# Patient Record
Sex: FEMALE | Race: WHITE | NOT HISPANIC OR LATINO | URBAN - METROPOLITAN AREA
[De-identification: names, ages, dates, MRNs, and addresses within clinical notes are randomized per-mention and may not be internally consistent; named-entity substitution may affect disease eponyms.]

---

## 2023-05-18 ENCOUNTER — OFFICE VISIT (OUTPATIENT)
Dept: PRIMARY CARE | Facility: CLINIC | Age: 48
End: 2023-05-18
Payer: MEDICAID

## 2023-05-18 VITALS
DIASTOLIC BLOOD PRESSURE: 80 MMHG | BODY MASS INDEX: 37.5 KG/M2 | HEIGHT: 59 IN | SYSTOLIC BLOOD PRESSURE: 110 MMHG | HEART RATE: 93 BPM | WEIGHT: 186 LBS

## 2023-05-18 DIAGNOSIS — Z12.11 COLON CANCER SCREENING: ICD-10-CM

## 2023-05-18 DIAGNOSIS — Z76.89 ENCOUNTER TO ESTABLISH CARE: ICD-10-CM

## 2023-05-18 DIAGNOSIS — Z72.0 TOBACCO USE: ICD-10-CM

## 2023-05-18 DIAGNOSIS — Z12.31 VISIT FOR SCREENING MAMMOGRAM: ICD-10-CM

## 2023-05-18 DIAGNOSIS — Z00.00 HEALTHCARE MAINTENANCE: Primary | ICD-10-CM

## 2023-05-18 PROCEDURE — 99386 PREV VISIT NEW AGE 40-64: CPT | Performed by: CLINICAL NURSE SPECIALIST

## 2023-05-18 RX ORDER — MULTIVITAMIN
1 TABLET ORAL DAILY
COMMUNITY

## 2023-05-18 ASSESSMENT — ENCOUNTER SYMPTOMS
WHEEZING: 0
JOINT SWELLING: 0
SORE THROAT: 0
NECK PAIN: 0
DYSURIA: 0
ARTHRALGIAS: 0
BLOOD IN STOOL: 0
WOUND: 0
DIZZINESS: 0
DEPRESSION: 0
BRUISES/BLEEDS EASILY: 0
CHEST TIGHTNESS: 0
ACTIVITY CHANGE: 0
SLEEP DISTURBANCE: 0
PHOTOPHOBIA: 0
SEIZURES: 0
EYE PAIN: 0
MYALGIAS: 0
CHILLS: 0
FEVER: 0
PALPITATIONS: 0
CONSTIPATION: 0
CONFUSION: 0
POLYDIPSIA: 0
LOSS OF SENSATION IN FEET: 0
HEMATURIA: 0
TROUBLE SWALLOWING: 0
ABDOMINAL PAIN: 0
UNEXPECTED WEIGHT CHANGE: 0
VOMITING: 0
BACK PAIN: 0
SHORTNESS OF BREATH: 0
COUGH: 0
FLANK PAIN: 0
APPETITE CHANGE: 0
FATIGUE: 1
OCCASIONAL FEELINGS OF UNSTEADINESS: 0
NAUSEA: 0
DIARRHEA: 0
HEADACHES: 0

## 2023-05-18 ASSESSMENT — PATIENT HEALTH QUESTIONNAIRE - PHQ9
2. FEELING DOWN, DEPRESSED OR HOPELESS: NOT AT ALL
1. LITTLE INTEREST OR PLEASURE IN DOING THINGS: NOT AT ALL
SUM OF ALL RESPONSES TO PHQ9 QUESTIONS 1 AND 2: 0

## 2023-05-18 ASSESSMENT — COLUMBIA-SUICIDE SEVERITY RATING SCALE - C-SSRS
6. HAVE YOU EVER DONE ANYTHING, STARTED TO DO ANYTHING, OR PREPARED TO DO ANYTHING TO END YOUR LIFE?: NO
1. IN THE PAST MONTH, HAVE YOU WISHED YOU WERE DEAD OR WISHED YOU COULD GO TO SLEEP AND NOT WAKE UP?: NO
2. HAVE YOU ACTUALLY HAD ANY THOUGHTS OF KILLING YOURSELF?: NO

## 2023-05-18 NOTE — PROGRESS NOTES
"Subjective   Patient ID: Aziza Gimenez is a 48 y.o. female who presents for Establish Care (New patient wellness exam ).  HPI    New patient here today to establish care.  Here today for a Wellness Exam. Has been years since she has seen a PCP.     Thought that she hit Menopause a few years ago. Followed with GYN from [facility]. US done. She then had Biopsy done in November 2022. States that they told her that she was \"good.\"     Patient reports that she has been having increased issues with Fatigue. Patient states that she gained about 30 pounds over the past 3 years. States that she will feel fatigued as the day goes on. Sleeping well. Denies any concerns for Apnea.      Review of Systems   Constitutional:  Positive for fatigue. Negative for activity change, appetite change, chills, fever and unexpected weight change.   HENT:  Negative for ear pain, hearing loss, nosebleeds, sore throat, tinnitus and trouble swallowing.    Eyes:  Negative for photophobia, pain and visual disturbance.   Respiratory:  Negative for cough, chest tightness, shortness of breath and wheezing.    Cardiovascular:  Negative for chest pain, palpitations and leg swelling.   Gastrointestinal:  Negative for abdominal pain, blood in stool, constipation, diarrhea, nausea and vomiting.   Endocrine: Negative for cold intolerance, heat intolerance, polydipsia and polyuria.   Genitourinary:  Negative for dysuria, flank pain and hematuria.   Musculoskeletal:  Negative for arthralgias, back pain, joint swelling, myalgias and neck pain.   Skin:  Negative for pallor, rash and wound.   Allergic/Immunologic: Negative for immunocompromised state.   Neurological:  Negative for dizziness, seizures and headaches.   Hematological:  Does not bruise/bleed easily.   Psychiatric/Behavioral:  Negative for confusion and sleep disturbance.        Objective   Physical Exam  Vitals and nursing note reviewed.   Constitutional:       General: She is not in acute distress.   "   Appearance: Normal appearance.   HENT:      Head: Normocephalic.      Nose: Nose normal.   Eyes:      Conjunctiva/sclera: Conjunctivae normal.   Neck:      Vascular: No carotid bruit.   Cardiovascular:      Rate and Rhythm: Normal rate and regular rhythm.      Pulses: Normal pulses.      Heart sounds: Normal heart sounds.   Pulmonary:      Effort: Pulmonary effort is normal.      Breath sounds: Normal breath sounds.   Abdominal:      General: Bowel sounds are normal.      Palpations: Abdomen is soft.   Musculoskeletal:         General: Normal range of motion.      Cervical back: Normal range of motion.   Skin:     General: Skin is warm and dry.   Neurological:      Mental Status: She is alert and oriented to person, place, and time. Mental status is at baseline.   Psychiatric:         Mood and Affect: Mood normal.         Behavior: Behavior normal.         Assessment/Plan       Wellness: Routine and age appropriate recommendations discussed with the patient today and patient verbalized understanding of the recommendations.  Questions answered.  Age appropriate immunizations and preventative screenings discussed with the patient and ordered as appropriate. Labs updated and ordered as indicated. Recommend healthy diet and daily exercise to maintain healthy body weight.   Tobacco Use: Counseled on long term negative health impacts on health if tobacco use continued.  Reviewed options for cessation aid including patches, lozenges, Wellbutrin, Chantix.  Discussed motivational factors to improve chances for success. CT Cardiac Scoring ordered.      Colonoscopy ordered.   Mammogram ordered.    COVID Vaccine: April 2022.   Tdap: 2019.

## 2023-05-20 ENCOUNTER — LAB (OUTPATIENT)
Dept: LAB | Facility: LAB | Age: 48
End: 2023-05-20
Payer: MEDICAID

## 2023-05-20 DIAGNOSIS — Z72.0 TOBACCO USE: ICD-10-CM

## 2023-05-20 DIAGNOSIS — Z00.00 HEALTHCARE MAINTENANCE: ICD-10-CM

## 2023-05-20 DIAGNOSIS — Z12.11 COLON CANCER SCREENING: ICD-10-CM

## 2023-05-20 DIAGNOSIS — Z12.31 VISIT FOR SCREENING MAMMOGRAM: ICD-10-CM

## 2023-05-20 LAB
ALANINE AMINOTRANSFERASE (SGPT) (U/L) IN SER/PLAS: 16 U/L (ref 7–45)
ALBUMIN (G/DL) IN SER/PLAS: 4 G/DL (ref 3.4–5)
ALKALINE PHOSPHATASE (U/L) IN SER/PLAS: 111 U/L (ref 33–110)
ANION GAP IN SER/PLAS: 11 MMOL/L (ref 10–20)
ASPARTATE AMINOTRANSFERASE (SGOT) (U/L) IN SER/PLAS: 15 U/L (ref 9–39)
BILIRUBIN TOTAL (MG/DL) IN SER/PLAS: 0.3 MG/DL (ref 0–1.2)
CALCIUM (MG/DL) IN SER/PLAS: 9.5 MG/DL (ref 8.6–10.3)
CARBON DIOXIDE, TOTAL (MMOL/L) IN SER/PLAS: 26 MMOL/L (ref 21–32)
CHLORIDE (MMOL/L) IN SER/PLAS: 108 MMOL/L (ref 98–107)
CHOLESTEROL (MG/DL) IN SER/PLAS: 229 MG/DL (ref 0–199)
CHOLESTEROL IN HDL (MG/DL) IN SER/PLAS: 39.5 MG/DL
CHOLESTEROL/HDL RATIO: 5.8
COBALAMIN (VITAMIN B12) (PG/ML) IN SER/PLAS: 236 PG/ML (ref 211–911)
CREATININE (MG/DL) IN SER/PLAS: 0.86 MG/DL (ref 0.5–1.05)
ERYTHROCYTE DISTRIBUTION WIDTH (RATIO) BY AUTOMATED COUNT: 14.6 % (ref 11.5–14.5)
ERYTHROCYTE MEAN CORPUSCULAR HEMOGLOBIN CONCENTRATION (G/DL) BY AUTOMATED: 32.8 G/DL (ref 32–36)
ERYTHROCYTE MEAN CORPUSCULAR VOLUME (FL) BY AUTOMATED COUNT: 90 FL (ref 80–100)
ERYTHROCYTES (10*6/UL) IN BLOOD BY AUTOMATED COUNT: 4.87 X10E12/L (ref 4–5.2)
GFR FEMALE: 82 ML/MIN/1.73M2
GLUCOSE (MG/DL) IN SER/PLAS: 79 MG/DL (ref 74–99)
HEMATOCRIT (%) IN BLOOD BY AUTOMATED COUNT: 43.6 % (ref 36–46)
HEMOGLOBIN (G/DL) IN BLOOD: 14.3 G/DL (ref 12–16)
LDL: 149 MG/DL (ref 0–99)
LEUKOCYTES (10*3/UL) IN BLOOD BY AUTOMATED COUNT: 6.6 X10E9/L (ref 4.4–11.3)
NON HDL CHOLESTEROL: 190 MG/DL
PLATELETS (10*3/UL) IN BLOOD AUTOMATED COUNT: 284 X10E9/L (ref 150–450)
POTASSIUM (MMOL/L) IN SER/PLAS: 4.5 MMOL/L (ref 3.5–5.3)
PROTEIN TOTAL: 6.3 G/DL (ref 6.4–8.2)
SODIUM (MMOL/L) IN SER/PLAS: 140 MMOL/L (ref 136–145)
THYROTROPIN (MIU/L) IN SER/PLAS BY DETECTION LIMIT <= 0.05 MIU/L: 2.22 MIU/L (ref 0.44–3.98)
TRIGLYCERIDE (MG/DL) IN SER/PLAS: 205 MG/DL (ref 0–149)
UREA NITROGEN (MG/DL) IN SER/PLAS: 9 MG/DL (ref 6–23)
VLDL: 41 MG/DL (ref 0–40)

## 2023-05-20 PROCEDURE — 84443 ASSAY THYROID STIM HORMONE: CPT

## 2023-05-20 PROCEDURE — 36415 COLL VENOUS BLD VENIPUNCTURE: CPT

## 2023-05-20 PROCEDURE — 80053 COMPREHEN METABOLIC PANEL: CPT

## 2023-05-20 PROCEDURE — 82607 VITAMIN B-12: CPT

## 2023-05-20 PROCEDURE — 85027 COMPLETE CBC AUTOMATED: CPT

## 2023-05-20 PROCEDURE — 80061 LIPID PANEL: CPT

## 2023-05-22 ENCOUNTER — TELEPHONE (OUTPATIENT)
Dept: PRIMARY CARE | Facility: CLINIC | Age: 48
End: 2023-05-22
Payer: MEDICAID

## 2023-05-22 NOTE — TELEPHONE ENCOUNTER
----- Message from TAIWO Russell-CNS sent at 5/21/2023 10:24 PM EDT -----  Please call patient with lab results. Cholesterol is elevated. Will wait for results of CT Cardiac Scoring to guide further management. B12 is on the low end of normal, start/increase Vitamin B12 OTC. Thank you!

## 2023-06-15 ENCOUNTER — TELEPHONE (OUTPATIENT)
Dept: PRIMARY CARE | Facility: CLINIC | Age: 48
End: 2023-06-15
Payer: MEDICAID

## 2023-06-16 ENCOUNTER — TELEPHONE (OUTPATIENT)
Dept: PRIMARY CARE | Facility: CLINIC | Age: 48
End: 2023-06-16
Payer: MEDICAID

## 2023-06-16 NOTE — TELEPHONE ENCOUNTER
----- Message from TAIWO Russell-CNS sent at 6/15/2023 10:46 PM EDT -----  Please call patient with results. CT Cardiac Score is 0. Incidental findings noted on lungs. Please ask if she is having any respiratory symptoms.  If not, can discuss further at follow up appointment. Thank you!

## 2023-07-06 ENCOUNTER — HOSPITAL ENCOUNTER (OUTPATIENT)
Dept: DATA CONVERSION | Facility: HOSPITAL | Age: 48
End: 2023-07-06
Attending: INTERNAL MEDICINE | Admitting: INTERNAL MEDICINE
Payer: MEDICAID

## 2023-07-06 DIAGNOSIS — D12.2 BENIGN NEOPLASM OF ASCENDING COLON: ICD-10-CM

## 2023-07-06 DIAGNOSIS — Z80.0 FAMILY HISTORY OF MALIGNANT NEOPLASM OF DIGESTIVE ORGANS: ICD-10-CM

## 2023-07-06 DIAGNOSIS — K64.0 FIRST DEGREE HEMORRHOIDS: ICD-10-CM

## 2023-07-06 DIAGNOSIS — E66.9 OBESITY, UNSPECIFIED: ICD-10-CM

## 2023-07-06 DIAGNOSIS — Z12.11 ENCOUNTER FOR SCREENING FOR MALIGNANT NEOPLASM OF COLON: ICD-10-CM

## 2023-07-06 DIAGNOSIS — F17.200 NICOTINE DEPENDENCE, UNSPECIFIED, UNCOMPLICATED: ICD-10-CM

## 2023-07-06 DIAGNOSIS — D12.8 BENIGN NEOPLASM OF RECTUM: ICD-10-CM

## 2023-07-11 LAB
COMPLETE PATHOLOGY REPORT: NORMAL
CONVERTED CLINICAL DIAGNOSIS-HISTORY: NORMAL
CONVERTED FINAL DIAGNOSIS: NORMAL
CONVERTED FINAL REPORT PDF LINK TO COPY AND PASTE: NORMAL
CONVERTED GROSS DESCRIPTION: NORMAL

## 2023-08-08 PROBLEM — H91.90 SUBJECTIVE HEARING LOSS: Status: ACTIVE | Noted: 2023-08-08

## 2023-08-18 ENCOUNTER — OFFICE VISIT (OUTPATIENT)
Dept: PRIMARY CARE | Facility: CLINIC | Age: 48
End: 2023-08-18
Payer: MEDICAID

## 2023-08-18 VITALS
DIASTOLIC BLOOD PRESSURE: 66 MMHG | HEIGHT: 59 IN | HEART RATE: 88 BPM | BODY MASS INDEX: 37.09 KG/M2 | SYSTOLIC BLOOD PRESSURE: 104 MMHG | WEIGHT: 184 LBS

## 2023-08-18 DIAGNOSIS — E53.8 VITAMIN B12 DEFICIENCY: Primary | ICD-10-CM

## 2023-08-18 DIAGNOSIS — Z12.11 COLON CANCER SCREENING: ICD-10-CM

## 2023-08-18 DIAGNOSIS — E78.2 MIXED HYPERLIPIDEMIA: ICD-10-CM

## 2023-08-18 DIAGNOSIS — Z12.31 VISIT FOR SCREENING MAMMOGRAM: ICD-10-CM

## 2023-08-18 DIAGNOSIS — Z72.0 TOBACCO USE: ICD-10-CM

## 2023-08-18 DIAGNOSIS — Z78.0 POST-MENOPAUSE: ICD-10-CM

## 2023-08-18 DIAGNOSIS — Z00.00 HEALTHCARE MAINTENANCE: ICD-10-CM

## 2023-08-18 PROCEDURE — 4004F PT TOBACCO SCREEN RCVD TLK: CPT | Performed by: CLINICAL NURSE SPECIALIST

## 2023-08-18 PROCEDURE — 96372 THER/PROPH/DIAG INJ SC/IM: CPT | Performed by: CLINICAL NURSE SPECIALIST

## 2023-08-18 PROCEDURE — 99214 OFFICE O/P EST MOD 30 MIN: CPT | Performed by: CLINICAL NURSE SPECIALIST

## 2023-08-18 RX ORDER — CYANOCOBALAMIN 1000 UG/ML
1000 INJECTION, SOLUTION INTRAMUSCULAR; SUBCUTANEOUS ONCE
Status: COMPLETED | OUTPATIENT
Start: 2023-08-18 | End: 2023-08-18

## 2023-08-18 RX ORDER — LANOLIN ALCOHOL/MO/W.PET/CERES
100 CREAM (GRAM) TOPICAL DAILY
COMMUNITY

## 2023-08-18 RX ADMIN — CYANOCOBALAMIN 1000 MCG: 1000 INJECTION, SOLUTION INTRAMUSCULAR; SUBCUTANEOUS at 11:56

## 2023-08-18 ASSESSMENT — ENCOUNTER SYMPTOMS
DEPRESSION: 0
BACK PAIN: 0
POLYDIPSIA: 0
CONSTIPATION: 0
BLOOD IN STOOL: 0
CHILLS: 0
HEMATURIA: 0
HEADACHES: 0
NAUSEA: 0
MYALGIAS: 0
ACTIVITY CHANGE: 0
DIARRHEA: 0
ARTHRALGIAS: 0
WHEEZING: 0
CONFUSION: 0
WOUND: 0
OCCASIONAL FEELINGS OF UNSTEADINESS: 0
PALPITATIONS: 0
EYE PAIN: 0
LOSS OF SENSATION IN FEET: 0
SLEEP DISTURBANCE: 0
JOINT SWELLING: 0
APPETITE CHANGE: 0
SHORTNESS OF BREATH: 0
VOMITING: 0
ABDOMINAL PAIN: 0
DIZZINESS: 0
TROUBLE SWALLOWING: 0
SORE THROAT: 0
COUGH: 0
FATIGUE: 1
BRUISES/BLEEDS EASILY: 0
NECK PAIN: 0
FLANK PAIN: 0
PHOTOPHOBIA: 0
CHEST TIGHTNESS: 0
SEIZURES: 0
DYSURIA: 0
UNEXPECTED WEIGHT CHANGE: 0
FEVER: 0

## 2023-08-18 ASSESSMENT — COLUMBIA-SUICIDE SEVERITY RATING SCALE - C-SSRS
2. HAVE YOU ACTUALLY HAD ANY THOUGHTS OF KILLING YOURSELF?: NO
1. IN THE PAST MONTH, HAVE YOU WISHED YOU WERE DEAD OR WISHED YOU COULD GO TO SLEEP AND NOT WAKE UP?: NO
6. HAVE YOU EVER DONE ANYTHING, STARTED TO DO ANYTHING, OR PREPARED TO DO ANYTHING TO END YOUR LIFE?: NO

## 2023-08-18 ASSESSMENT — PATIENT HEALTH QUESTIONNAIRE - PHQ9
SUM OF ALL RESPONSES TO PHQ9 QUESTIONS 1 AND 2: 0
1. LITTLE INTEREST OR PLEASURE IN DOING THINGS: NOT AT ALL
2. FEELING DOWN, DEPRESSED OR HOPELESS: NOT AT ALL

## 2023-08-18 NOTE — PROGRESS NOTES
"Subjective   Patient ID: Aziza Gimenez is a 48 y.o. female who presents for Follow-up (Follow up).  HPI    Here today as a follow up appointment.      Thought that she hit Menopause a few years ago. Followed with GYN from [facility]. US done. She then had Biopsy done in November 2022. States that they told her that she was \"good.\"      Patient reports that she has been having increased issues with Fatigue. Patient states that she gained about 30 pounds over the past 3 years. States that she will feel fatigued as the day goes on. Sleeping well. Denies any concerns for Apnea.      Review of Systems   Constitutional:  Positive for fatigue. Negative for activity change, appetite change, chills, fever and unexpected weight change.   HENT:  Negative for ear pain, hearing loss, nosebleeds, sore throat, tinnitus and trouble swallowing.    Eyes:  Negative for photophobia, pain and visual disturbance.   Respiratory:  Negative for cough, chest tightness, shortness of breath and wheezing.    Cardiovascular:  Negative for chest pain, palpitations and leg swelling.   Gastrointestinal:  Negative for abdominal pain, blood in stool, constipation, diarrhea, nausea and vomiting.   Endocrine: Negative for cold intolerance, heat intolerance, polydipsia and polyuria.   Genitourinary:  Negative for dysuria, flank pain and hematuria.   Musculoskeletal:  Negative for arthralgias, back pain, joint swelling, myalgias and neck pain.   Skin:  Negative for pallor, rash and wound.   Allergic/Immunologic: Negative for immunocompromised state.   Neurological:  Negative for dizziness, seizures and headaches.   Hematological:  Does not bruise/bleed easily.   Psychiatric/Behavioral:  Negative for confusion and sleep disturbance.        Objective   Physical Exam  Vitals and nursing note reviewed.   Constitutional:       General: She is not in acute distress.     Appearance: Normal appearance.   HENT:      Head: Normocephalic.      Nose: Nose normal. "   Eyes:      Conjunctiva/sclera: Conjunctivae normal.   Neck:      Vascular: No carotid bruit.   Cardiovascular:      Rate and Rhythm: Normal rate and regular rhythm.      Pulses: Normal pulses.      Heart sounds: Normal heart sounds.   Pulmonary:      Effort: Pulmonary effort is normal.      Breath sounds: Normal breath sounds.   Abdominal:      General: Bowel sounds are normal.      Palpations: Abdomen is soft.   Musculoskeletal:         General: Normal range of motion.      Cervical back: Normal range of motion.   Skin:     General: Skin is warm and dry.   Neurological:      Mental Status: She is alert and oriented to person, place, and time. Mental status is at baseline.   Psychiatric:         Mood and Affect: Mood normal.         Behavior: Behavior normal.       Assessment/Plan       Discussed results of testing and blood work with patient.    Tobacco Use: Counseled on long term negative health impacts on health if tobacco use continued.  Reviewed options for cessation aid including patches, lozenges, Wellbutrin, Chantix.  Discussed motivational factors to improve chances for success.    Vitamin B12 Deficiency: Vitamin B12 injections. Reevaluate with follow up blood work.   Obesity, Hyperlipidemia: BMI: 37.16. Lifestyle changes recommended: Diet consisting of low fat foods, lean meats, high fiber, fresh fruits and vegetables. 150 min/ weekly aerobic exercise. Reevaluate with follow up blood work to discuss medication options. CT Cardiac Scoring: June 2023, Score 0.      Colonoscopy: July 2023. Plan to repeat in 5 years.   Mammogram: June 2023, normal.   COVID Vaccine: October 2021, April 2022.   COVID Vaccine: April 2022.   Tdap: 2019.

## 2023-08-21 ENCOUNTER — APPOINTMENT (OUTPATIENT)
Dept: PRIMARY CARE | Facility: CLINIC | Age: 48
End: 2023-08-21
Payer: MEDICAID

## 2023-09-21 ENCOUNTER — CLINICAL SUPPORT (OUTPATIENT)
Dept: PRIMARY CARE | Facility: CLINIC | Age: 48
End: 2023-09-21
Payer: MEDICAID

## 2023-09-21 DIAGNOSIS — E53.8 VITAMIN B12 DEFICIENCY: ICD-10-CM

## 2023-09-21 PROCEDURE — 96372 THER/PROPH/DIAG INJ SC/IM: CPT | Performed by: CLINICAL NURSE SPECIALIST

## 2023-09-21 RX ORDER — CYANOCOBALAMIN 1000 UG/ML
1000 INJECTION, SOLUTION INTRAMUSCULAR; SUBCUTANEOUS ONCE
Status: COMPLETED | OUTPATIENT
Start: 2023-09-21 | End: 2023-09-21

## 2023-09-21 RX ADMIN — CYANOCOBALAMIN 1000 MCG: 1000 INJECTION, SOLUTION INTRAMUSCULAR; SUBCUTANEOUS at 11:31

## 2023-09-29 VITALS — WEIGHT: 174.16 LBS | HEIGHT: 58 IN | BODY MASS INDEX: 36.56 KG/M2

## 2023-10-20 ENCOUNTER — CLINICAL SUPPORT (OUTPATIENT)
Dept: PRIMARY CARE | Facility: CLINIC | Age: 48
End: 2023-10-20
Payer: MEDICAID

## 2023-10-20 DIAGNOSIS — E53.8 VITAMIN B12 DEFICIENCY: ICD-10-CM

## 2023-10-20 PROCEDURE — 96372 THER/PROPH/DIAG INJ SC/IM: CPT | Performed by: STUDENT IN AN ORGANIZED HEALTH CARE EDUCATION/TRAINING PROGRAM

## 2023-10-20 RX ORDER — CYANOCOBALAMIN 1000 UG/ML
1000 INJECTION, SOLUTION INTRAMUSCULAR; SUBCUTANEOUS ONCE
Status: COMPLETED | OUTPATIENT
Start: 2023-10-20 | End: 2023-10-20

## 2023-10-20 RX ADMIN — CYANOCOBALAMIN 1000 MCG: 1000 INJECTION, SOLUTION INTRAMUSCULAR; SUBCUTANEOUS at 12:14

## 2023-11-20 ENCOUNTER — OFFICE VISIT (OUTPATIENT)
Dept: PRIMARY CARE | Facility: CLINIC | Age: 48
End: 2023-11-20
Payer: MEDICAID

## 2023-11-20 ENCOUNTER — LAB (OUTPATIENT)
Dept: LAB | Facility: LAB | Age: 48
End: 2023-11-20
Payer: MEDICAID

## 2023-11-20 VITALS
HEART RATE: 87 BPM | SYSTOLIC BLOOD PRESSURE: 110 MMHG | DIASTOLIC BLOOD PRESSURE: 62 MMHG | WEIGHT: 183 LBS | BODY MASS INDEX: 36.89 KG/M2 | HEIGHT: 59 IN

## 2023-11-20 DIAGNOSIS — Z72.0 TOBACCO USE: ICD-10-CM

## 2023-11-20 DIAGNOSIS — Z12.31 VISIT FOR SCREENING MAMMOGRAM: ICD-10-CM

## 2023-11-20 DIAGNOSIS — Z12.11 COLON CANCER SCREENING: ICD-10-CM

## 2023-11-20 DIAGNOSIS — E53.8 VITAMIN B12 DEFICIENCY: ICD-10-CM

## 2023-11-20 DIAGNOSIS — E78.2 MIXED HYPERLIPIDEMIA: ICD-10-CM

## 2023-11-20 LAB
ALBUMIN SERPL BCP-MCNC: 4.3 G/DL (ref 3.4–5)
ALP SERPL-CCNC: 104 U/L (ref 33–110)
ALT SERPL W P-5'-P-CCNC: 14 U/L (ref 7–45)
ANION GAP SERPL CALC-SCNC: 11 MMOL/L (ref 10–20)
AST SERPL W P-5'-P-CCNC: 13 U/L (ref 9–39)
BILIRUB SERPL-MCNC: 0.3 MG/DL (ref 0–1.2)
BUN SERPL-MCNC: 9 MG/DL (ref 6–23)
CALCIUM SERPL-MCNC: 9.7 MG/DL (ref 8.6–10.3)
CHLORIDE SERPL-SCNC: 105 MMOL/L (ref 98–107)
CHOLEST SERPL-MCNC: 241 MG/DL (ref 0–199)
CHOLESTEROL/HDL RATIO: 6.6
CO2 SERPL-SCNC: 27 MMOL/L (ref 21–32)
CREAT SERPL-MCNC: 0.76 MG/DL (ref 0.5–1.05)
GFR SERPL CREATININE-BSD FRML MDRD: >90 ML/MIN/1.73M*2
GLUCOSE SERPL-MCNC: 85 MG/DL (ref 74–99)
HDLC SERPL-MCNC: 36.4 MG/DL
LDLC SERPL CALC-MCNC: 150 MG/DL
NON HDL CHOLESTEROL: 205 MG/DL (ref 0–149)
POTASSIUM SERPL-SCNC: 4.4 MMOL/L (ref 3.5–5.3)
PROT SERPL-MCNC: 6.5 G/DL (ref 6.4–8.2)
SODIUM SERPL-SCNC: 139 MMOL/L (ref 136–145)
TRIGL SERPL-MCNC: 271 MG/DL (ref 0–149)
VIT B12 SERPL-MCNC: 313 PG/ML (ref 211–911)
VLDL: 54 MG/DL (ref 0–40)

## 2023-11-20 PROCEDURE — 82607 VITAMIN B-12: CPT

## 2023-11-20 PROCEDURE — 80053 COMPREHEN METABOLIC PANEL: CPT

## 2023-11-20 PROCEDURE — 80061 LIPID PANEL: CPT

## 2023-11-20 PROCEDURE — 96372 THER/PROPH/DIAG INJ SC/IM: CPT | Performed by: CLINICAL NURSE SPECIALIST

## 2023-11-20 PROCEDURE — 36415 COLL VENOUS BLD VENIPUNCTURE: CPT

## 2023-11-20 PROCEDURE — 4004F PT TOBACCO SCREEN RCVD TLK: CPT | Performed by: CLINICAL NURSE SPECIALIST

## 2023-11-20 PROCEDURE — 99213 OFFICE O/P EST LOW 20 MIN: CPT | Performed by: CLINICAL NURSE SPECIALIST

## 2023-11-20 RX ORDER — CYANOCOBALAMIN 1000 UG/ML
1000 INJECTION, SOLUTION INTRAMUSCULAR; SUBCUTANEOUS ONCE
Status: COMPLETED | OUTPATIENT
Start: 2023-11-20 | End: 2023-11-20

## 2023-11-20 RX ADMIN — CYANOCOBALAMIN 1000 MCG: 1000 INJECTION, SOLUTION INTRAMUSCULAR; SUBCUTANEOUS at 11:52

## 2023-11-20 ASSESSMENT — ENCOUNTER SYMPTOMS
CONFUSION: 0
NAUSEA: 0
SEIZURES: 0
UNEXPECTED WEIGHT CHANGE: 0
WHEEZING: 0
MYALGIAS: 0
VOMITING: 0
SORE THROAT: 0
NECK PAIN: 0
DYSURIA: 0
ARTHRALGIAS: 0
EYE PAIN: 0
BLOOD IN STOOL: 0
SLEEP DISTURBANCE: 0
POLYDIPSIA: 0
PHOTOPHOBIA: 0
HEMATURIA: 0
FLANK PAIN: 0
CHEST TIGHTNESS: 0
DEPRESSION: 0
LOSS OF SENSATION IN FEET: 0
OCCASIONAL FEELINGS OF UNSTEADINESS: 0
DIARRHEA: 0
PALPITATIONS: 0
FATIGUE: 0
DIZZINESS: 0
CHILLS: 0
JOINT SWELLING: 0
BRUISES/BLEEDS EASILY: 0
APPETITE CHANGE: 0
SHORTNESS OF BREATH: 0
ACTIVITY CHANGE: 0
CONSTIPATION: 0
BACK PAIN: 0
ABDOMINAL PAIN: 0
COUGH: 0
HEADACHES: 0
WOUND: 0
TROUBLE SWALLOWING: 0
FEVER: 0

## 2023-11-20 ASSESSMENT — PATIENT HEALTH QUESTIONNAIRE - PHQ9
1. LITTLE INTEREST OR PLEASURE IN DOING THINGS: NOT AT ALL
2. FEELING DOWN, DEPRESSED OR HOPELESS: NOT AT ALL
SUM OF ALL RESPONSES TO PHQ9 QUESTIONS 1 AND 2: 0

## 2023-11-20 NOTE — PROGRESS NOTES
"Subjective   Patient ID: Aziza Gimenez is a 48 y.o. female who presents for Follow-up (3 month follow up).  HPI    Here today as a follow up appointment.      Thought that she hit Menopause a few years ago. Followed with GYN from [facility]. US done. She then had Biopsy done in November 2022. States that they told her that she was \"good.\"      Patient reports that she has been having increased issues with Fatigue. Patient states that she gained about 30 pounds over the past 3 years. States that she will feel fatigued as the day goes on. Sleeping well. Denies any concerns for Apnea.      Started B12 injections. Lab work done today prior to follow up appointment.     Review of Systems   Constitutional:  Negative for activity change, appetite change, chills, fatigue, fever and unexpected weight change.   HENT:  Negative for ear pain, hearing loss, nosebleeds, sore throat, tinnitus and trouble swallowing.    Eyes:  Negative for photophobia, pain and visual disturbance.   Respiratory:  Negative for cough, chest tightness, shortness of breath and wheezing.    Cardiovascular:  Negative for chest pain, palpitations and leg swelling.   Gastrointestinal:  Negative for abdominal pain, blood in stool, constipation, diarrhea, nausea and vomiting.   Endocrine: Negative for cold intolerance, heat intolerance, polydipsia and polyuria.   Genitourinary:  Negative for dysuria, flank pain and hematuria.   Musculoskeletal:  Negative for arthralgias, back pain, joint swelling, myalgias and neck pain.   Skin:  Negative for pallor, rash and wound.   Allergic/Immunologic: Negative for immunocompromised state.   Neurological:  Negative for dizziness, seizures and headaches.   Hematological:  Does not bruise/bleed easily.   Psychiatric/Behavioral:  Negative for confusion and sleep disturbance.        Objective   Physical Exam  Vitals and nursing note reviewed.   Constitutional:       General: She is not in acute distress.     Appearance: Normal " appearance.   HENT:      Head: Normocephalic.      Nose: Nose normal.   Eyes:      Conjunctiva/sclera: Conjunctivae normal.   Neck:      Vascular: No carotid bruit.   Cardiovascular:      Rate and Rhythm: Normal rate and regular rhythm.      Pulses: Normal pulses.      Heart sounds: Normal heart sounds.   Pulmonary:      Effort: Pulmonary effort is normal.      Breath sounds: Normal breath sounds.   Abdominal:      General: Bowel sounds are normal.      Palpations: Abdomen is soft.   Musculoskeletal:         General: Normal range of motion.      Cervical back: Normal range of motion.   Skin:     General: Skin is warm and dry.   Neurological:      Mental Status: She is alert and oriented to person, place, and time. Mental status is at baseline.   Psychiatric:         Mood and Affect: Mood normal.         Behavior: Behavior normal.       Assessment/Plan       No results available at time of visit for review. Will follow up when results are received.      Tobacco Use: Counseled on long term negative health impacts on health if tobacco use continued.  Reviewed options for cessation aid including patches, lozenges, Wellbutrin, Chantix.  Discussed motivational factors to improve chances for success.    Vitamin B12 Deficiency: Vitamin B12 injections. Reevaluate with follow up blood work.   Obesity, Hyperlipidemia: BMI: 36.96. Lifestyle changes recommended: Diet consisting of low fat foods, lean meats, high fiber, fresh fruits and vegetables. 150 min/ weekly aerobic exercise. Reevaluate with follow up blood work to discuss medication options. CT Cardiac Scoring: June 2023, Score 0.      Colonoscopy: July 2023. Plan to repeat in 5 years.   Mammogram: June 2023, normal.   COVID Vaccine: October 2021, April 2022.   COVID Vaccine: April 2022.   Tdap: 2019.   Bone Density: Ordered for 2023.

## 2023-11-22 ENCOUNTER — TELEPHONE (OUTPATIENT)
Dept: PRIMARY CARE | Facility: CLINIC | Age: 48
End: 2023-11-22
Payer: MEDICAID

## 2023-11-22 DIAGNOSIS — E78.2 MIXED HYPERLIPIDEMIA: ICD-10-CM

## 2023-11-22 DIAGNOSIS — E53.8 VITAMIN B12 DEFICIENCY: ICD-10-CM

## 2023-11-22 DIAGNOSIS — Z00.00 HEALTHCARE MAINTENANCE: ICD-10-CM

## 2023-11-22 DIAGNOSIS — Z78.0 POST-MENOPAUSE: ICD-10-CM

## 2023-11-22 RX ORDER — ATORVASTATIN CALCIUM 10 MG/1
10 TABLET, FILM COATED ORAL DAILY
Qty: 30 TABLET | Refills: 5 | Status: SHIPPED | OUTPATIENT
Start: 2023-11-22 | End: 2024-05-20 | Stop reason: SDUPTHER

## 2023-11-22 NOTE — TELEPHONE ENCOUNTER
----- Message from TAIWO Russell-CNS sent at 11/22/2023 12:58 PM EST -----  Please call patient with lab results. Cholesterol is worse. LDL above goal. Would consider Statin therapy if agreeable. B12 is still on the lower end. Would continue monthly injections. Repeat all lab work prior to follow up appointment. Thank you!

## 2023-12-20 ENCOUNTER — CLINICAL SUPPORT (OUTPATIENT)
Dept: PRIMARY CARE | Facility: CLINIC | Age: 48
End: 2023-12-20
Payer: MEDICAID

## 2023-12-20 DIAGNOSIS — E53.8 VITAMIN B12 DEFICIENCY: ICD-10-CM

## 2023-12-20 PROCEDURE — 96372 THER/PROPH/DIAG INJ SC/IM: CPT | Performed by: CLINICAL NURSE SPECIALIST

## 2023-12-20 RX ORDER — CYANOCOBALAMIN 1000 UG/ML
1000 INJECTION, SOLUTION INTRAMUSCULAR; SUBCUTANEOUS ONCE
Status: COMPLETED | OUTPATIENT
Start: 2023-12-20 | End: 2023-12-20

## 2023-12-20 RX ADMIN — CYANOCOBALAMIN 1000 MCG: 1000 INJECTION, SOLUTION INTRAMUSCULAR; SUBCUTANEOUS at 09:31

## 2024-05-20 ENCOUNTER — OFFICE VISIT (OUTPATIENT)
Dept: PRIMARY CARE | Facility: CLINIC | Age: 49
End: 2024-05-20
Payer: MEDICAID

## 2024-05-20 ENCOUNTER — LAB (OUTPATIENT)
Dept: LAB | Facility: LAB | Age: 49
End: 2024-05-20
Payer: MEDICAID

## 2024-05-20 VITALS
SYSTOLIC BLOOD PRESSURE: 106 MMHG | HEART RATE: 86 BPM | BODY MASS INDEX: 36.69 KG/M2 | DIASTOLIC BLOOD PRESSURE: 60 MMHG | HEIGHT: 59 IN | WEIGHT: 182 LBS

## 2024-05-20 DIAGNOSIS — Z00.00 HEALTHCARE MAINTENANCE: ICD-10-CM

## 2024-05-20 DIAGNOSIS — E53.8 VITAMIN B12 DEFICIENCY: ICD-10-CM

## 2024-05-20 DIAGNOSIS — E78.2 MIXED HYPERLIPIDEMIA: ICD-10-CM

## 2024-05-20 DIAGNOSIS — Z00.00 HEALTHCARE MAINTENANCE: Primary | ICD-10-CM

## 2024-05-20 DIAGNOSIS — Z12.11 COLON CANCER SCREENING: ICD-10-CM

## 2024-05-20 DIAGNOSIS — Z78.0 POST-MENOPAUSAL: ICD-10-CM

## 2024-05-20 DIAGNOSIS — Z72.0 TOBACCO USE: ICD-10-CM

## 2024-05-20 DIAGNOSIS — Z12.31 VISIT FOR SCREENING MAMMOGRAM: ICD-10-CM

## 2024-05-20 LAB
25(OH)D3 SERPL-MCNC: 10 NG/ML (ref 30–100)
ALBUMIN SERPL BCP-MCNC: 4 G/DL (ref 3.4–5)
ALP SERPL-CCNC: 101 U/L (ref 33–110)
ALT SERPL W P-5'-P-CCNC: 15 U/L (ref 7–45)
ANION GAP SERPL CALC-SCNC: 13 MMOL/L (ref 10–20)
AST SERPL W P-5'-P-CCNC: 14 U/L (ref 9–39)
BASOPHILS # BLD AUTO: 0.04 X10*3/UL (ref 0–0.1)
BASOPHILS NFR BLD AUTO: 0.6 %
BILIRUB SERPL-MCNC: 0.3 MG/DL (ref 0–1.2)
BUN SERPL-MCNC: 11 MG/DL (ref 6–23)
CALCIUM SERPL-MCNC: 9.4 MG/DL (ref 8.6–10.3)
CHLORIDE SERPL-SCNC: 107 MMOL/L (ref 98–107)
CHOLEST SERPL-MCNC: 203 MG/DL (ref 0–199)
CHOLESTEROL/HDL RATIO: 5.9
CO2 SERPL-SCNC: 24 MMOL/L (ref 21–32)
CREAT SERPL-MCNC: 0.91 MG/DL (ref 0.5–1.05)
EGFRCR SERPLBLD CKD-EPI 2021: 77 ML/MIN/1.73M*2
EOSINOPHIL # BLD AUTO: 0.24 X10*3/UL (ref 0–0.7)
EOSINOPHIL NFR BLD AUTO: 3.7 %
ERYTHROCYTE [DISTWIDTH] IN BLOOD BY AUTOMATED COUNT: 14.6 % (ref 11.5–14.5)
GLUCOSE SERPL-MCNC: 78 MG/DL (ref 74–99)
HCT VFR BLD AUTO: 43.6 % (ref 36–46)
HDLC SERPL-MCNC: 34.4 MG/DL
HGB BLD-MCNC: 14.2 G/DL (ref 12–16)
IMM GRANULOCYTES # BLD AUTO: 0.02 X10*3/UL (ref 0–0.7)
IMM GRANULOCYTES NFR BLD AUTO: 0.3 % (ref 0–0.9)
LDLC SERPL CALC-MCNC: 127 MG/DL
LYMPHOCYTES # BLD AUTO: 2.39 X10*3/UL (ref 1.2–4.8)
LYMPHOCYTES NFR BLD AUTO: 37.3 %
MCH RBC QN AUTO: 28.5 PG (ref 26–34)
MCHC RBC AUTO-ENTMCNC: 32.6 G/DL (ref 32–36)
MCV RBC AUTO: 88 FL (ref 80–100)
MONOCYTES # BLD AUTO: 0.36 X10*3/UL (ref 0.1–1)
MONOCYTES NFR BLD AUTO: 5.6 %
NEUTROPHILS # BLD AUTO: 3.36 X10*3/UL (ref 1.2–7.7)
NEUTROPHILS NFR BLD AUTO: 52.5 %
NON HDL CHOLESTEROL: 169 MG/DL (ref 0–149)
NRBC BLD-RTO: 0 /100 WBCS (ref 0–0)
PLATELET # BLD AUTO: 287 X10*3/UL (ref 150–450)
POTASSIUM SERPL-SCNC: 4.4 MMOL/L (ref 3.5–5.3)
PROT SERPL-MCNC: 6 G/DL (ref 6.4–8.2)
RBC # BLD AUTO: 4.98 X10*6/UL (ref 4–5.2)
SODIUM SERPL-SCNC: 140 MMOL/L (ref 136–145)
TRIGL SERPL-MCNC: 210 MG/DL (ref 0–149)
TSH SERPL-ACNC: 2.16 MIU/L (ref 0.44–3.98)
VIT B12 SERPL-MCNC: 462 PG/ML (ref 211–911)
VLDL: 42 MG/DL (ref 0–40)
WBC # BLD AUTO: 6.4 X10*3/UL (ref 4.4–11.3)

## 2024-05-20 PROCEDURE — 84443 ASSAY THYROID STIM HORMONE: CPT

## 2024-05-20 PROCEDURE — 80061 LIPID PANEL: CPT

## 2024-05-20 PROCEDURE — 80053 COMPREHEN METABOLIC PANEL: CPT

## 2024-05-20 PROCEDURE — 85025 COMPLETE CBC W/AUTO DIFF WBC: CPT

## 2024-05-20 PROCEDURE — 82607 VITAMIN B-12: CPT

## 2024-05-20 PROCEDURE — 36415 COLL VENOUS BLD VENIPUNCTURE: CPT

## 2024-05-20 PROCEDURE — 99214 OFFICE O/P EST MOD 30 MIN: CPT | Performed by: CLINICAL NURSE SPECIALIST

## 2024-05-20 PROCEDURE — 82306 VITAMIN D 25 HYDROXY: CPT

## 2024-05-20 RX ORDER — ATORVASTATIN CALCIUM 10 MG/1
10 TABLET, FILM COATED ORAL DAILY
Qty: 90 TABLET | Refills: 3 | Status: SHIPPED | OUTPATIENT
Start: 2024-05-20 | End: 2025-05-15

## 2024-05-20 ASSESSMENT — ENCOUNTER SYMPTOMS
LOSS OF SENSATION IN FEET: 0
VOMITING: 0
ARTHRALGIAS: 0
SORE THROAT: 0
ABDOMINAL PAIN: 0
FATIGUE: 0
TROUBLE SWALLOWING: 0
HEMATURIA: 0
WHEEZING: 0
BLOOD IN STOOL: 0
PHOTOPHOBIA: 0
CHEST TIGHTNESS: 0
EYE PAIN: 0
APPETITE CHANGE: 0
DEPRESSION: 0
POLYDIPSIA: 0
NECK PAIN: 0
HEADACHES: 0
JOINT SWELLING: 0
UNEXPECTED WEIGHT CHANGE: 0
CONSTIPATION: 0
NAUSEA: 0
SHORTNESS OF BREATH: 0
CONFUSION: 0
COUGH: 0
PALPITATIONS: 0
DIZZINESS: 0
CHILLS: 0
FLANK PAIN: 0
OCCASIONAL FEELINGS OF UNSTEADINESS: 0
FEVER: 0
WOUND: 0
DIARRHEA: 0
SEIZURES: 0
ACTIVITY CHANGE: 0
BACK PAIN: 0
DYSURIA: 0
BRUISES/BLEEDS EASILY: 0
SLEEP DISTURBANCE: 0
MYALGIAS: 0

## 2024-05-20 ASSESSMENT — COLUMBIA-SUICIDE SEVERITY RATING SCALE - C-SSRS
6. HAVE YOU EVER DONE ANYTHING, STARTED TO DO ANYTHING, OR PREPARED TO DO ANYTHING TO END YOUR LIFE?: NO
2. HAVE YOU ACTUALLY HAD ANY THOUGHTS OF KILLING YOURSELF?: NO
1. IN THE PAST MONTH, HAVE YOU WISHED YOU WERE DEAD OR WISHED YOU COULD GO TO SLEEP AND NOT WAKE UP?: NO

## 2024-05-20 ASSESSMENT — PATIENT HEALTH QUESTIONNAIRE - PHQ9
2. FEELING DOWN, DEPRESSED OR HOPELESS: NOT AT ALL
SUM OF ALL RESPONSES TO PHQ9 QUESTIONS 1 AND 2: 0
1. LITTLE INTEREST OR PLEASURE IN DOING THINGS: NOT AT ALL

## 2024-05-20 NOTE — PROGRESS NOTES
"Subjective   Patient ID: Aziza Gimenez is a 49 y.o. female who presents for Follow-up (Follow up).  HPI    Here today as a follow up appointment. Blood work completed today. No results available at time of OV.      Thought that she hit Menopause a few years ago. Followed with GYN from [facility]. US done. She then had Biopsy done in November 2022. States that they told her that she was \"good.\" Requesting a new referral to GYN.      Patient reports that she has been having increased issues with Fatigue. Patient states that she gained about 30 pounds over the past 3 years. States that she will feel fatigued as the day goes on. Sleeping well. Denies any concerns for Apnea.  Weight has been stable since last OV, lost 2 pounds.      Started B12 injections. Lab work done today prior to follow up appointment.  Has not had another injection since last OV. Started Vitamin B12 oral medication.     Currently smoking about 1/2 ppd. Has smoked since 18 years ago. Previously smoked more but has cut back to 1/2 ppd.     Review of Systems   Constitutional:  Negative for activity change, appetite change, chills, fatigue, fever and unexpected weight change.   HENT:  Negative for ear pain, hearing loss, nosebleeds, sore throat, tinnitus and trouble swallowing.    Eyes:  Negative for photophobia, pain and visual disturbance.   Respiratory:  Negative for cough, chest tightness, shortness of breath and wheezing.    Cardiovascular:  Negative for chest pain, palpitations and leg swelling.   Gastrointestinal:  Negative for abdominal pain, blood in stool, constipation, diarrhea, nausea and vomiting.   Endocrine: Negative for cold intolerance, heat intolerance, polydipsia and polyuria.   Genitourinary:  Negative for dysuria, flank pain and hematuria.   Musculoskeletal:  Negative for arthralgias, back pain, joint swelling, myalgias and neck pain.   Skin:  Negative for pallor, rash and wound.   Allergic/Immunologic: Negative for immunocompromised " state.   Neurological:  Negative for dizziness, seizures and headaches.   Hematological:  Does not bruise/bleed easily.   Psychiatric/Behavioral:  Negative for confusion and sleep disturbance.        Objective   Physical Exam  Vitals and nursing note reviewed.   Constitutional:       General: She is not in acute distress.     Appearance: Normal appearance.   HENT:      Head: Normocephalic.      Nose: Nose normal.   Eyes:      Conjunctiva/sclera: Conjunctivae normal.   Neck:      Vascular: No carotid bruit.   Cardiovascular:      Rate and Rhythm: Normal rate and regular rhythm.      Pulses: Normal pulses.      Heart sounds: Normal heart sounds.   Pulmonary:      Effort: Pulmonary effort is normal.      Breath sounds: Normal breath sounds.   Abdominal:      General: Bowel sounds are normal.      Palpations: Abdomen is soft.   Musculoskeletal:         General: Normal range of motion.      Cervical back: Normal range of motion.   Skin:     General: Skin is warm and dry.   Neurological:      Mental Status: She is alert and oriented to person, place, and time. Mental status is at baseline.   Psychiatric:         Mood and Affect: Mood normal.         Behavior: Behavior normal.       Assessment/Plan        No results available at time of visit for review. Will follow up when results are received.      Tobacco Use: Counseled on long term negative health impacts on health if tobacco use continued.  Reviewed options for cessation aid including patches, lozenges, Wellbutrin, Chantix.  Discussed motivational factors to improve chances for success.  CT Lung Screening ordered.   Hyperlipidemia: Has not been consistent in taking Atorvastatin. Discussed consistency. CT Cardiac Scoring: June 2023, Score 0.   Vitamin B12 Deficiency: Vitamin B12 injections but hasn't had one since November. Continued b12 supplement. Reevaluate with follow up blood work.   Obesity, Hyperlipidemia: BMI: 36.76. Lifestyle changes recommended: Diet  consisting of low fat foods, lean meats, high fiber, fresh fruits and vegetables. 150 min/ weekly aerobic exercise. Reevaluate with follow up blood work to discuss medication options.      Colonoscopy: July 2023. Plan to repeat in 5 years.   Mammogram: June 2023, normal. Ordered for 2024.   COVID Vaccine: April 2022.   Tdap: 2019.   Bone Density: Ordered.   Referral to GYN provided.     TAIWO Russell-CNS 05/20/24 11:15 AM

## 2024-05-21 ENCOUNTER — TELEPHONE (OUTPATIENT)
Dept: PRIMARY CARE | Facility: CLINIC | Age: 49
End: 2024-05-21
Payer: MEDICAID

## 2024-05-21 DIAGNOSIS — E53.8 VITAMIN B12 DEFICIENCY: ICD-10-CM

## 2024-05-21 DIAGNOSIS — E78.2 MIXED HYPERLIPIDEMIA: ICD-10-CM

## 2024-05-21 DIAGNOSIS — Z00.00 HEALTHCARE MAINTENANCE: ICD-10-CM

## 2024-05-21 DIAGNOSIS — Z78.0 POST-MENOPAUSAL: ICD-10-CM

## 2024-05-21 DIAGNOSIS — Z78.0 POST-MENOPAUSE: ICD-10-CM

## 2024-05-21 NOTE — TELEPHONE ENCOUNTER
----- Message from TAIWO Russell-CNS sent at 5/20/2024 10:58 PM EDT -----  Please call patient with lab results. Cholesterol is still elevated, but has improved from previous lab work. Will continue to monitor. Vitamin D is very low. Start/increase Vitamin D. Remaining blood work is stable. Plan to repeat all lab work prior to follow up appointment. Please reorder lab work. Thank you!

## 2024-07-08 ENCOUNTER — HOSPITAL ENCOUNTER (OUTPATIENT)
Dept: RADIOLOGY | Facility: CLINIC | Age: 49
Discharge: HOME | End: 2024-07-08
Payer: MEDICAID

## 2024-07-08 ENCOUNTER — TELEPHONE (OUTPATIENT)
Dept: PRIMARY CARE | Facility: CLINIC | Age: 49
End: 2024-07-08
Payer: MEDICAID

## 2024-07-08 ENCOUNTER — APPOINTMENT (OUTPATIENT)
Dept: RADIOLOGY | Facility: CLINIC | Age: 49
End: 2024-07-08
Payer: MEDICAID

## 2024-07-08 VITALS — BODY MASS INDEX: 35.88 KG/M2 | WEIGHT: 178 LBS | HEIGHT: 59 IN

## 2024-07-08 DIAGNOSIS — Z78.0 POST-MENOPAUSAL: ICD-10-CM

## 2024-07-08 DIAGNOSIS — Z12.31 VISIT FOR SCREENING MAMMOGRAM: ICD-10-CM

## 2024-07-08 PROCEDURE — 77063 BREAST TOMOSYNTHESIS BI: CPT | Performed by: RADIOLOGY

## 2024-07-08 PROCEDURE — 77080 DXA BONE DENSITY AXIAL: CPT | Performed by: RADIOLOGY

## 2024-07-08 PROCEDURE — 77080 DXA BONE DENSITY AXIAL: CPT

## 2024-07-08 PROCEDURE — 77067 SCR MAMMO BI INCL CAD: CPT | Performed by: RADIOLOGY

## 2024-07-08 PROCEDURE — 77067 SCR MAMMO BI INCL CAD: CPT

## 2024-07-08 NOTE — TELEPHONE ENCOUNTER
----- Message from TAIWO Russell-CNS sent at 7/8/2024  1:27 PM EDT -----  Please let patient know that her mammogram is normal. Thank you!

## 2024-07-09 ENCOUNTER — TELEPHONE (OUTPATIENT)
Dept: PRIMARY CARE | Facility: CLINIC | Age: 49
End: 2024-07-09
Payer: MEDICAID

## 2024-07-09 NOTE — TELEPHONE ENCOUNTER
----- Message from TAIWO Russell-CNS sent at 7/8/2024  7:22 PM EDT -----  Please let patient know that Bone Density indicates Osteopenia. Encourage weight bearing exercises with Vitamin D and Calcium supplement. Thank you!

## 2024-09-30 PROBLEM — Z01.419 WOMEN'S ANNUAL ROUTINE GYNECOLOGICAL EXAMINATION: Status: ACTIVE | Noted: 2024-09-30

## 2024-09-30 NOTE — PROGRESS NOTES
Aziza Gimneez is a 49 y.o. female who is here for a new routine GYN exam.   PCP = TAIWO Russell-CNS  Family H/o gyn or colon cancer: mother with breast cancer, paternal aunt with ovarian cancer  H/o PMB in , with benign EMB  APE Concerns:     Other Concerns:   Preventative:  Last mammogram: 24, normal   Last Colonoscopy: , normal    DEXA: 24, osteopenia  Seat Belt Use: always    GynHx:  Postmenopausal    Social History     Substance and Sexual Activity   Sexual Activity Defer     Current contraception: None  STIs: none  Last PAP:       SoHx:  Substance:   Tobacco Use: High Risk (2024)    Patient History     Smoking Tobacco Use: Every Day     Smokeless Tobacco Use: Never     Passive Exposure: Not on file      Social History     Substance and Sexual Activity   Drug Use Never      Social History     Substance and Sexual Activity   Alcohol Use Never       Past Medical History:   Diagnosis Date    Encounter for full-term uncomplicated delivery ([facility])     Normal vaginal delivery    Encounter for screening for malignant neoplasm of vagina     Vaginal Pap smear    Other conditions influencing health status     History of pregnancy    Other microscopic hematuria     Microhematuria    Personal history of other complications of pregnancy, childbirth and the puerperium     History of spontaneous     Personal history of other endocrine, nutritional and metabolic disease     History of obesity    Personal history of other medical treatment     History of pelvic ultrasound    Personal history of other medical treatment     History of mammography, screening    Personal history of other medical treatment     H/O Doppler ultrasound      Past Surgical History:   Procedure Laterality Date    ADENOIDECTOMY      EXPLORATORY LAPAROTOMY  10/09/2014    Exploratory Laparotomy    FINGER TENDON REPAIR Left     TONSILLECTOMY        Objective   There were no vitals taken for this visit.     General:    Alert and oriented, in no acute distress   Neck: Supple. No visible thyromegaly.    Breast/Axilla: Normal to palpation bilaterally without masses, skin changes, or nipple discharge.    Abdomen: Soft, non-tender, without masses or organomegaly   Vulva: Normal architecture without erythema, masses, or lesions.    Vagina: Normal mucosa without lesions, masses, or atrophy. No abnormal vaginal discharge.    Cervix: Normal without masses, lesions, or signs of cervicitis.    Uterus: Normal mobile, non-enlarged uterus    Adnexa: Normal without masses or lesions   Pelvic Floor No POP noted. No high tone pelvic floor    Psych Normal affect. Normal mood.      Problem List Items Addressed This Visit       Women's annual routine gynecological examination - Primary      Assessment/Plan    Thank you for coming to your annual exam. Your findings during the exam were normal. Specific topics addressed during this exam included: healthy lifestyle, well woman screening guidelines,  Healthy diet with high fiber, Weight bearing exercise 3 to 5 times a week, Multivitamins and calcium         Actions performed during this visit include:  - Clinical breast exam  - Clinical pelvic exam  - PAP and HPV ordered           Please return for your next visit in 1 year.

## 2024-10-04 ENCOUNTER — APPOINTMENT (OUTPATIENT)
Dept: OBSTETRICS AND GYNECOLOGY | Facility: CLINIC | Age: 49
End: 2024-10-04
Payer: MEDICAID

## 2024-10-04 VITALS
WEIGHT: 177 LBS | HEIGHT: 59 IN | DIASTOLIC BLOOD PRESSURE: 82 MMHG | SYSTOLIC BLOOD PRESSURE: 118 MMHG | BODY MASS INDEX: 35.68 KG/M2

## 2024-10-04 DIAGNOSIS — Z11.51 SCREENING FOR HUMAN PAPILLOMAVIRUS (HPV): ICD-10-CM

## 2024-10-04 DIAGNOSIS — Z01.419 WOMEN'S ANNUAL ROUTINE GYNECOLOGICAL EXAMINATION: Primary | ICD-10-CM

## 2024-10-04 DIAGNOSIS — Z12.4 SCREENING FOR CERVICAL CANCER: ICD-10-CM

## 2024-10-04 PROCEDURE — 99396 PREV VISIT EST AGE 40-64: CPT | Performed by: OBSTETRICS & GYNECOLOGY

## 2024-10-04 PROCEDURE — 3008F BODY MASS INDEX DOCD: CPT | Performed by: OBSTETRICS & GYNECOLOGY

## 2024-10-04 PROCEDURE — 88175 CYTOPATH C/V AUTO FLUID REDO: CPT

## 2024-10-04 PROCEDURE — 87624 HPV HI-RISK TYP POOLED RSLT: CPT

## 2024-10-18 LAB
CYTOLOGY CMNT CVX/VAG CYTO-IMP: NORMAL
HPV HR 12 DNA GENITAL QL NAA+PROBE: NEGATIVE
HPV HR GENOTYPES PNL CVX NAA+PROBE: NEGATIVE
HPV16 DNA SPEC QL NAA+PROBE: NEGATIVE
HPV18 DNA SPEC QL NAA+PROBE: NEGATIVE
LAB AP HPV GENOTYPE QUESTION: YES
LAB AP HPV HR: NORMAL
LABORATORY COMMENT REPORT: NORMAL
PATH REPORT.TOTAL CANCER: NORMAL

## 2024-11-25 ENCOUNTER — APPOINTMENT (OUTPATIENT)
Dept: PRIMARY CARE | Facility: CLINIC | Age: 49
End: 2024-11-25
Payer: MEDICAID

## 2025-11-03 ENCOUNTER — APPOINTMENT (OUTPATIENT)
Dept: OBSTETRICS AND GYNECOLOGY | Facility: CLINIC | Age: 50
End: 2025-11-03
Payer: MEDICAID